# Patient Record
Sex: FEMALE | Race: WHITE
[De-identification: names, ages, dates, MRNs, and addresses within clinical notes are randomized per-mention and may not be internally consistent; named-entity substitution may affect disease eponyms.]

---

## 2020-07-06 LAB
ALBUMIN SERPL BCP-MCNC: 3.6 G/DL (ref 3.4–5)
ALBUMIN/GLOB SERPL: 1.1 {RATIO} (ref 1.1–1.5)
ALP SERPL-CCNC: 74 IU/L (ref 46–116)
ALT SERPL W P-5'-P-CCNC: 26 U/L (ref 30–65)
ANION GAP SERPL CALCULATED.3IONS-SCNC: 9 MMOL/L (ref 8–16)
AST SERPL W P-5'-P-CCNC: 17 U/L (ref 10–37)
BASOPHILS # BLD AUTO: 0 X10'3 (ref 0–0.2)
BASOPHILS NFR BLD AUTO: 0.9 % (ref 0–1)
BILIRUB SERPL-MCNC: 0.2 MG/DL (ref 0–1)
BUN SERPL-MCNC: 18 MG/DL (ref 7–18)
BUN/CREAT SERPL: 26.5 (ref 6.6–38)
CALCIUM SERPL-MCNC: 9.1 MG/DL (ref 8.5–10.1)
CHLORIDE SERPL-SCNC: 108 MMOL/L (ref 99–107)
CO2 SERPL-SCNC: 26.9 MMOL/L (ref 24–32)
CREAT SERPL-MCNC: 0.68 MG/DL (ref 0.4–0.9)
EOSINOPHIL # BLD AUTO: 0.1 X10'3 (ref 0–0.9)
EOSINOPHIL NFR BLD AUTO: 1.4 % (ref 0–6)
ERYTHROCYTE [DISTWIDTH] IN BLOOD BY AUTOMATED COUNT: 13.3 % (ref 11.5–14.5)
GFR SERPL CREATININE-BSD FRML MDRD: 88 ML/MIN
GLUCOSE SERPL-MCNC: 106 MG/DL (ref 70–104)
HCT VFR BLD AUTO: 38.3 % (ref 35–45)
HGB BLD-MCNC: 12.8 G/DL (ref 12–16)
LYMPHOCYTES # BLD AUTO: 1.9 X10'3 (ref 1.1–4.8)
LYMPHOCYTES NFR BLD AUTO: 34.7 % (ref 21–51)
MCH RBC QN AUTO: 30.6 PG (ref 27–31)
MCHC RBC AUTO-ENTMCNC: 33.4 G/DL (ref 33–36.5)
MCV RBC AUTO: 91.7 FL (ref 78–98)
MONOCYTES # BLD AUTO: 0.4 X10'3 (ref 0–0.9)
MONOCYTES NFR BLD AUTO: 7.1 % (ref 2–12)
NEUTROPHILS # BLD AUTO: 3 X10'3 (ref 1.8–7.7)
NEUTROPHILS NFR BLD AUTO: 55.9 % (ref 42–75)
PLATELET # BLD AUTO: 260 X10'3 (ref 140–440)
PMV BLD AUTO: 6.2 FL (ref 7.4–10.4)
POTASSIUM SERPL-SCNC: 3.9 MMOL/L (ref 3.4–5.1)
PROT SERPL-MCNC: 6.9 G/DL (ref 6.4–8.2)
RBC # BLD AUTO: 4.17 X10'6 (ref 4.2–5.6)
SODIUM SERPL-SCNC: 144 MMOL/L (ref 135–145)

## 2020-07-13 ENCOUNTER — HOSPITAL ENCOUNTER (OUTPATIENT)
Dept: HOSPITAL 94 - PAS | Age: 60
Discharge: HOME | End: 2020-07-13
Attending: ORTHOPAEDIC SURGERY
Payer: MEDICARE

## 2020-07-13 VITALS — DIASTOLIC BLOOD PRESSURE: 82 MMHG | SYSTOLIC BLOOD PRESSURE: 133 MMHG

## 2020-07-13 VITALS — SYSTOLIC BLOOD PRESSURE: 116 MMHG | DIASTOLIC BLOOD PRESSURE: 74 MMHG

## 2020-07-13 VITALS — DIASTOLIC BLOOD PRESSURE: 91 MMHG | SYSTOLIC BLOOD PRESSURE: 130 MMHG

## 2020-07-13 VITALS — SYSTOLIC BLOOD PRESSURE: 114 MMHG | DIASTOLIC BLOOD PRESSURE: 75 MMHG

## 2020-07-13 VITALS — HEIGHT: 64 IN | WEIGHT: 159 LBS | BODY MASS INDEX: 27.14 KG/M2

## 2020-07-13 VITALS — DIASTOLIC BLOOD PRESSURE: 81 MMHG | SYSTOLIC BLOOD PRESSURE: 138 MMHG

## 2020-07-13 VITALS — SYSTOLIC BLOOD PRESSURE: 129 MMHG | DIASTOLIC BLOOD PRESSURE: 80 MMHG

## 2020-07-13 DIAGNOSIS — Z11.59: ICD-10-CM

## 2020-07-13 DIAGNOSIS — M81.0: ICD-10-CM

## 2020-07-13 DIAGNOSIS — F41.9: ICD-10-CM

## 2020-07-13 DIAGNOSIS — M65.342: ICD-10-CM

## 2020-07-13 DIAGNOSIS — M65.331: ICD-10-CM

## 2020-07-13 DIAGNOSIS — F17.210: ICD-10-CM

## 2020-07-13 DIAGNOSIS — F31.9: ICD-10-CM

## 2020-07-13 DIAGNOSIS — M65.332: Primary | ICD-10-CM

## 2020-07-13 DIAGNOSIS — Z79.899: ICD-10-CM

## 2020-07-13 DIAGNOSIS — Z88.8: ICD-10-CM

## 2020-07-13 PROCEDURE — 80053 COMPREHEN METABOLIC PANEL: CPT

## 2020-07-13 PROCEDURE — 26055 INCISE FINGER TENDON SHEATH: CPT

## 2020-07-13 PROCEDURE — 20550 NJX 1 TENDON SHEATH/LIGAMENT: CPT

## 2020-07-13 PROCEDURE — 36415 COLL VENOUS BLD VENIPUNCTURE: CPT

## 2020-07-13 PROCEDURE — 85025 COMPLETE CBC W/AUTO DIFF WBC: CPT

## 2020-07-13 PROCEDURE — 82948 REAGENT STRIP/BLOOD GLUCOSE: CPT

## 2020-07-13 PROCEDURE — 93005 ELECTROCARDIOGRAM TRACING: CPT

## 2020-07-13 NOTE — NUR
Recommended to patient to go to GP and have her facial abscess seen and assessed. Pt states 
she will follow up.

## 2020-07-13 NOTE — NUR
PATIENT A&OX4, DENIES PAIN, V/S WNL, NEUROVASCULAR CHECKS INTACT, 20G PIV LUE D/C, SCD OFF, 
DRESSING TO RIGHT WRIST CDI ELEVATED WITH ICEBAG APPLIED. I HAVE REVIEWED D/C INSTRUCTIONS 
WITH PATIENT AND FAMILY AND THEY HAVE VERBALIZED UNDERSTANDING. PATIENT D/C HOME WITH ALL 
BELONGINGS AND FAMILY GAVE TRANSPORT HOME. PT states pain meds have called in to pharmacy 
from MD office.

## 2020-07-13 NOTE — NUR
Received from OR via [facility], accompanied by Anesthesiologist Elaine and report given by 
Anesthesiolgist. PATIENT A&OX4, DENIES PAIN, V/S WNL mask to 10L [id number], NEUROVASCULAR 
CHECKS INTACT, 20G PIV R hand, SCD ON, DRESSING TO RIGHT WRIST CDI ELEVATED WITH ICEBAG 
APPLIED.

## 2020-07-13 NOTE — NUR
PT TEARFUL, STATES "IT HURTS".  SHE FURTHER STATES SHE "WAS AFRAID TO SHOW US BECAUSE 
SURGERY MAY BE CANCELED". PT THEN PULLS DOWN HER MASK, THE LEFT UPPER LIP IS RED AND SWOLLEN 
AND THE LEFT CHEEK IS SWOLLEN. PT STATES HER LIP STARTED GETTING RED AND SWOLLEN YESTERDAY 
MORNING. DR CARBAJAL NOTIFIED BY PHONE AND DR GUARDADO AT THE BEDSIDE.  OKAY TO PROCEED 
WITH SURGERY AT THIS POINT.  PT INSTRUCTED TO F/U WITH PRIMARY MD OR CLINIC POST -OP.

## 2020-09-02 LAB
ALBUMIN SERPL BCP-MCNC: 3.8 G/DL (ref 3.4–5)
ALBUMIN/GLOB SERPL: 1.1 {RATIO} (ref 1.1–1.5)
ALP SERPL-CCNC: 92 IU/L (ref 46–116)
ALT SERPL W P-5'-P-CCNC: 23 U/L (ref 30–65)
ANION GAP SERPL CALCULATED.3IONS-SCNC: 7 MMOL/L (ref 8–16)
AST SERPL W P-5'-P-CCNC: 13 U/L (ref 10–37)
BASOPHILS # BLD AUTO: 0 X10'3 (ref 0–0.2)
BASOPHILS NFR BLD AUTO: 0.7 % (ref 0–1)
BILIRUB SERPL-MCNC: 0.3 MG/DL (ref 0–1)
BUN SERPL-MCNC: 15 MG/DL (ref 7–18)
BUN/CREAT SERPL: 19.5 (ref 6.6–38)
CALCIUM SERPL-MCNC: 8.9 MG/DL (ref 8.5–10.1)
CHLORIDE SERPL-SCNC: 105 MMOL/L (ref 99–107)
CO2 SERPL-SCNC: 27.2 MMOL/L (ref 24–32)
CREAT SERPL-MCNC: 0.77 MG/DL (ref 0.4–0.9)
EOSINOPHIL # BLD AUTO: 0.1 X10'3 (ref 0–0.9)
EOSINOPHIL NFR BLD AUTO: 1.3 % (ref 0–6)
ERYTHROCYTE [DISTWIDTH] IN BLOOD BY AUTOMATED COUNT: 13.9 % (ref 11.5–14.5)
GFR SERPL CREATININE-BSD FRML MDRD: 76 ML/MIN
GLUCOSE SERPL-MCNC: 98 MG/DL (ref 70–104)
HCT VFR BLD AUTO: 41.4 % (ref 35–45)
HGB BLD-MCNC: 13.7 G/DL (ref 12–16)
LYMPHOCYTES # BLD AUTO: 2.1 X10'3 (ref 1.1–4.8)
LYMPHOCYTES NFR BLD AUTO: 35.9 % (ref 21–51)
MCH RBC QN AUTO: 30.1 PG (ref 27–31)
MCHC RBC AUTO-ENTMCNC: 33.1 G/DL (ref 33–36.5)
MCV RBC AUTO: 91 FL (ref 78–98)
MONOCYTES # BLD AUTO: 0.4 X10'3 (ref 0–0.9)
MONOCYTES NFR BLD AUTO: 6.2 % (ref 2–12)
NEUTROPHILS # BLD AUTO: 3.3 X10'3 (ref 1.8–7.7)
NEUTROPHILS NFR BLD AUTO: 55.9 % (ref 42–75)
PLATELET # BLD AUTO: 299 X10'3 (ref 140–440)
PMV BLD AUTO: 6.6 FL (ref 7.4–10.4)
POTASSIUM SERPL-SCNC: 4 MMOL/L (ref 3.4–5.1)
PROT SERPL-MCNC: 7.2 G/DL (ref 6.4–8.2)
RBC # BLD AUTO: 4.55 X10'6 (ref 4.2–5.6)
SODIUM SERPL-SCNC: 139 MMOL/L (ref 135–145)

## 2020-09-11 ENCOUNTER — HOSPITAL ENCOUNTER (OUTPATIENT)
Dept: HOSPITAL 94 - PAS | Age: 60
Discharge: HOME | End: 2020-09-11
Attending: ORTHOPAEDIC SURGERY
Payer: MEDICARE

## 2020-09-11 VITALS — SYSTOLIC BLOOD PRESSURE: 100 MMHG | DIASTOLIC BLOOD PRESSURE: 51 MMHG

## 2020-09-11 VITALS — WEIGHT: 167.55 LBS | HEIGHT: 64 IN | BODY MASS INDEX: 28.6 KG/M2

## 2020-09-11 VITALS — DIASTOLIC BLOOD PRESSURE: 61 MMHG | SYSTOLIC BLOOD PRESSURE: 105 MMHG

## 2020-09-11 VITALS — DIASTOLIC BLOOD PRESSURE: 78 MMHG | SYSTOLIC BLOOD PRESSURE: 123 MMHG

## 2020-09-11 VITALS — DIASTOLIC BLOOD PRESSURE: 65 MMHG | SYSTOLIC BLOOD PRESSURE: 111 MMHG

## 2020-09-11 VITALS — DIASTOLIC BLOOD PRESSURE: 60 MMHG | SYSTOLIC BLOOD PRESSURE: 90 MMHG

## 2020-09-11 VITALS — SYSTOLIC BLOOD PRESSURE: 96 MMHG | DIASTOLIC BLOOD PRESSURE: 51 MMHG

## 2020-09-11 DIAGNOSIS — M65.331: Primary | ICD-10-CM

## 2020-09-11 DIAGNOSIS — B07.8: ICD-10-CM

## 2020-09-11 DIAGNOSIS — Z20.828: ICD-10-CM

## 2020-09-11 DIAGNOSIS — G89.4: ICD-10-CM

## 2020-09-11 DIAGNOSIS — I27.29: ICD-10-CM

## 2020-09-11 DIAGNOSIS — Z98.51: ICD-10-CM

## 2020-09-11 DIAGNOSIS — M19.90: ICD-10-CM

## 2020-09-11 DIAGNOSIS — E66.8: ICD-10-CM

## 2020-09-11 DIAGNOSIS — K21.9: ICD-10-CM

## 2020-09-11 DIAGNOSIS — M19.011: ICD-10-CM

## 2020-09-11 DIAGNOSIS — M41.9: ICD-10-CM

## 2020-09-11 DIAGNOSIS — F17.210: ICD-10-CM

## 2020-09-11 DIAGNOSIS — Z88.8: ICD-10-CM

## 2020-09-11 DIAGNOSIS — M81.0: ICD-10-CM

## 2020-09-11 DIAGNOSIS — G25.81: ICD-10-CM

## 2020-09-11 DIAGNOSIS — M85.80: ICD-10-CM

## 2020-09-11 DIAGNOSIS — Z98.890: ICD-10-CM

## 2020-09-11 DIAGNOSIS — Z90.710: ICD-10-CM

## 2020-09-11 DIAGNOSIS — F41.9: ICD-10-CM

## 2020-09-11 DIAGNOSIS — Z79.899: ICD-10-CM

## 2020-09-11 DIAGNOSIS — F31.9: ICD-10-CM

## 2020-09-11 PROCEDURE — 11420 EXC H-F-NK-SP B9+MARG 0.5/<: CPT

## 2020-09-11 PROCEDURE — 36415 COLL VENOUS BLD VENIPUNCTURE: CPT

## 2020-09-11 PROCEDURE — 80053 COMPREHEN METABOLIC PANEL: CPT

## 2020-09-11 PROCEDURE — 26055 INCISE FINGER TENDON SHEATH: CPT

## 2020-09-11 PROCEDURE — 85025 COMPLETE CBC W/AUTO DIFF WBC: CPT

## 2020-09-11 PROCEDURE — 87635 SARS-COV-2 COVID-19 AMP PRB: CPT

## 2020-09-11 PROCEDURE — 82948 REAGENT STRIP/BLOOD GLUCOSE: CPT

## 2020-09-11 NOTE — NUR
PATIENT HAS MET ALL DC CRITERIA FOR HOME. VSS. PAIN AT A TOLERABLE LEVEL. PATIENT TAKEN OUT 
VIA WHEELCHAIR TO PERSONAL VEHICLE WHERE PATIENT WAS TAKEN HOME. DRESSING CDI. ALL DC 
INSTRUCTIONS DISCUSSED WITH PATIENT. ALL QUESTIONS ANSWERED. 




-------------------------------------------------------------------------------

Addendum: 09/11/20 at 1418 by Migel Neumann RN RN

-------------------------------------------------------------------------------

Amended: Links added.

## 2020-09-11 NOTE — NUR
Received from OR via [facility] , accompanied by Anesthesiologist DEBBY and report given 
by Anesthesiolgist. PATIENT WITH 20G PIV IN LEFT UE RUNNING LR ..DENIES PAIN TO RIGHT 
HAND. 

RIGHT MIDDLE FINGER DRESSING IS SPOTTED WITH BLOOD BUT CONTAINED WITHIN DRESSING. 


-------------------------------------------------------------------------------

Addendum: 09/11/20 at 1326 by Migel Neumann RN, RN

-------------------------------------------------------------------------------

Amended: Links added.

## 2022-04-22 LAB
ALBUMIN SERPL BCP-MCNC: 3.6 G/DL (ref 3.4–5)
ALBUMIN/GLOB SERPL: 1.2 {RATIO} (ref 1.1–1.5)
ALP SERPL-CCNC: 89 IU/L (ref 46–116)
ALT SERPL W P-5'-P-CCNC: 25 U/L (ref 30–65)
ANION GAP SERPL CALCULATED.3IONS-SCNC: 9 MMOL/L (ref 8–16)
AST SERPL W P-5'-P-CCNC: 18 U/L (ref 10–37)
BACTERIA URNS QL MICRO: (no result) /HPF
BASOPHILS # BLD AUTO: 0 X10'3 (ref 0–0.2)
BASOPHILS NFR BLD AUTO: 0.7 % (ref 0–1)
BILIRUB SERPL-MCNC: 0.2 MG/DL (ref 0–1)
BUN SERPL-MCNC: 16 MG/DL (ref 7–18)
BUN/CREAT SERPL: 21.9 (ref 6.6–38)
CALCIUM SERPL-MCNC: 8.9 MG/DL (ref 8.5–10.1)
CHLORIDE SERPL-SCNC: 103 MMOL/L (ref 99–107)
CLARITY UR: CLEAR
CO2 SERPL-SCNC: 27.5 MMOL/L (ref 24–32)
COLOR UR: YELLOW
CREAT SERPL-MCNC: 0.73 MG/DL (ref 0.4–0.9)
DEPRECATED SQUAMOUS URNS QL MICRO: (no result) /LPF
EOSINOPHIL # BLD AUTO: 0.1 X10'3 (ref 0–0.9)
EOSINOPHIL NFR BLD AUTO: 2.2 % (ref 0–6)
ERYTHROCYTE [DISTWIDTH] IN BLOOD BY AUTOMATED COUNT: 13.1 % (ref 11.5–14.5)
GFR SERPL CREATININE-BSD FRML MDRD: 81 ML/MIN
GLUCOSE SERPL-MCNC: 114 MG/DL (ref 70–104)
GLUCOSE UR STRIP-MCNC: NEGATIVE MG/DL
HCT VFR BLD AUTO: 37.4 % (ref 35–45)
HGB BLD-MCNC: 12.9 G/DL (ref 12–16)
HGB UR QL STRIP: (no result)
KETONES UR STRIP-MCNC: NEGATIVE MG/DL
LEUKOCYTE ESTERASE UR QL STRIP: NEGATIVE
LYMPHOCYTES # BLD AUTO: 2.5 X10'3 (ref 1.1–4.8)
LYMPHOCYTES NFR BLD AUTO: 39 % (ref 21–51)
MCH RBC QN AUTO: 30.2 PG (ref 27–31)
MCHC RBC AUTO-ENTMCNC: 34.6 G/DL (ref 33–36.5)
MCV RBC AUTO: 87.1 FL (ref 78–98)
MONOCYTES # BLD AUTO: 0.4 X10'3 (ref 0–0.9)
MONOCYTES NFR BLD AUTO: 6.2 % (ref 2–12)
MUCOUS THREADS URNS QL MICRO: (no result) /LPF
NEUTROPHILS # BLD AUTO: 3.3 X10'3 (ref 1.8–7.7)
NEUTROPHILS NFR BLD AUTO: 51.9 % (ref 42–75)
NITRITE UR QL STRIP: NEGATIVE
PH UR STRIP: 6 [PH] (ref 4.8–8)
PLATELET # BLD AUTO: 272 X10'3 (ref 140–440)
PMV BLD AUTO: 6.4 FL (ref 7.4–10.4)
POTASSIUM SERPL-SCNC: 3.7 MMOL/L (ref 3.4–5.1)
PROT SERPL-MCNC: 6.7 G/DL (ref 6.4–8.2)
PROT UR QL STRIP: NEGATIVE MG/DL
RBC # BLD AUTO: 4.29 X10'6 (ref 4.2–5.6)
RBC #/AREA URNS HPF: (no result) /HPF (ref 0–2)
SODIUM SERPL-SCNC: 139 MMOL/L (ref 135–145)
SP GR UR STRIP: >=1.03 (ref 1–1.03)
URN COLLECT METHOD CLASS: (no result)
UROBILINOGEN UR STRIP-MCNC: 0.2 E.U/DL (ref 0.2–1)
WBC #/AREA URNS HPF: (no result) /HPF (ref 0–4)

## 2022-04-29 ENCOUNTER — HOSPITAL ENCOUNTER (OUTPATIENT)
Dept: HOSPITAL 94 - PAS | Age: 62
Discharge: HOME | End: 2022-04-29
Attending: PODIATRIST
Payer: MEDICARE

## 2022-04-29 VITALS — SYSTOLIC BLOOD PRESSURE: 106 MMHG | DIASTOLIC BLOOD PRESSURE: 69 MMHG

## 2022-04-29 VITALS — DIASTOLIC BLOOD PRESSURE: 80 MMHG | SYSTOLIC BLOOD PRESSURE: 138 MMHG

## 2022-04-29 VITALS — SYSTOLIC BLOOD PRESSURE: 114 MMHG | DIASTOLIC BLOOD PRESSURE: 63 MMHG

## 2022-04-29 VITALS — SYSTOLIC BLOOD PRESSURE: 106 MMHG | DIASTOLIC BLOOD PRESSURE: 65 MMHG

## 2022-04-29 VITALS — SYSTOLIC BLOOD PRESSURE: 103 MMHG | DIASTOLIC BLOOD PRESSURE: 82 MMHG

## 2022-04-29 VITALS — DIASTOLIC BLOOD PRESSURE: 70 MMHG | SYSTOLIC BLOOD PRESSURE: 111 MMHG

## 2022-04-29 VITALS — DIASTOLIC BLOOD PRESSURE: 70 MMHG | SYSTOLIC BLOOD PRESSURE: 93 MMHG

## 2022-04-29 VITALS — DIASTOLIC BLOOD PRESSURE: 57 MMHG | SYSTOLIC BLOOD PRESSURE: 105 MMHG

## 2022-04-29 VITALS — HEIGHT: 64 IN | BODY MASS INDEX: 29.77 KG/M2 | WEIGHT: 174.39 LBS

## 2022-04-29 VITALS — DIASTOLIC BLOOD PRESSURE: 57 MMHG | SYSTOLIC BLOOD PRESSURE: 107 MMHG

## 2022-04-29 VITALS — SYSTOLIC BLOOD PRESSURE: 101 MMHG | DIASTOLIC BLOOD PRESSURE: 63 MMHG

## 2022-04-29 VITALS — SYSTOLIC BLOOD PRESSURE: 115 MMHG | DIASTOLIC BLOOD PRESSURE: 62 MMHG

## 2022-04-29 DIAGNOSIS — M96.0: Primary | ICD-10-CM

## 2022-04-29 DIAGNOSIS — F17.210: ICD-10-CM

## 2022-04-29 DIAGNOSIS — M41.9: ICD-10-CM

## 2022-04-29 DIAGNOSIS — Z98.51: ICD-10-CM

## 2022-04-29 DIAGNOSIS — Z98.890: ICD-10-CM

## 2022-04-29 DIAGNOSIS — Z90.710: ICD-10-CM

## 2022-04-29 DIAGNOSIS — Z79.899: ICD-10-CM

## 2022-04-29 DIAGNOSIS — G89.4: ICD-10-CM

## 2022-04-29 DIAGNOSIS — Z20.822: ICD-10-CM

## 2022-04-29 DIAGNOSIS — F31.9: ICD-10-CM

## 2022-04-29 DIAGNOSIS — K21.9: ICD-10-CM

## 2022-04-29 DIAGNOSIS — F41.9: ICD-10-CM

## 2022-04-29 DIAGNOSIS — G25.81: ICD-10-CM

## 2022-04-29 DIAGNOSIS — E66.8: ICD-10-CM

## 2022-04-29 DIAGNOSIS — G20: ICD-10-CM

## 2022-04-29 DIAGNOSIS — M85.80: ICD-10-CM

## 2022-04-29 DIAGNOSIS — I27.29: ICD-10-CM

## 2022-04-29 DIAGNOSIS — Z88.8: ICD-10-CM

## 2022-04-29 DIAGNOSIS — M19.011: ICD-10-CM

## 2022-04-29 DIAGNOSIS — M81.0: ICD-10-CM

## 2022-04-29 DIAGNOSIS — G89.18: ICD-10-CM

## 2022-04-29 DIAGNOSIS — Z79.82: ICD-10-CM

## 2022-04-29 PROCEDURE — 76000 FLUOROSCOPY <1 HR PHYS/QHP: CPT

## 2022-04-29 PROCEDURE — 36415 COLL VENOUS BLD VENIPUNCTURE: CPT

## 2022-04-29 PROCEDURE — 20900 REMOVAL OF BONE FOR GRAFT: CPT

## 2022-04-29 PROCEDURE — 93005 ELECTROCARDIOGRAM TRACING: CPT

## 2022-04-29 PROCEDURE — 64447 NJX AA&/STRD FEMORAL NRV IMG: CPT

## 2022-04-29 PROCEDURE — 64450 NJX AA&/STRD OTHER PN/BRANCH: CPT

## 2022-04-29 PROCEDURE — 81001 URINALYSIS AUTO W/SCOPE: CPT

## 2022-04-29 PROCEDURE — 82948 REAGENT STRIP/BLOOD GLUCOSE: CPT

## 2022-04-29 PROCEDURE — 20680 REMOVAL OF IMPLANT DEEP: CPT

## 2022-04-29 PROCEDURE — 80053 COMPREHEN METABOLIC PANEL: CPT

## 2022-04-29 PROCEDURE — 73620 X-RAY EXAM OF FOOT: CPT

## 2022-04-29 PROCEDURE — 28750 FUSION OF BIG TOE JOINT: CPT

## 2022-04-29 PROCEDURE — 85025 COMPLETE CBC W/AUTO DIFF WBC: CPT

## 2022-04-29 NOTE — NUR
Received from OR [facility] , accompanied by Anesthesiologist  DR ZHOU and report 
given by Anesthesiolgist. PT PRESENTS WITH PIV 20G RIGHT WRIST, DRESSING ON LEFT FOOT, BLOOD 
TINGED ON HEEL, VSS.

-------------------------------------------------------------------------------

Addendum: 04/29/22 at 1135 by Eugenia Neumann RN, RN

-------------------------------------------------------------------------------

Amended: Links added.

## 2022-04-29 NOTE — NUR
PT MEETS ALL DC CRITERIA.  IV DC'D WITH CANULA INTACT, LEFT FOOT/HEEL DRESSING CDI, NO 
DRAINAGE NOTED AT THIS TIME.  PT GIVEN BOOT FOR PARTIAL WEIGHT BEARING ALONG WITH CRUTCHES.  
DC INSTRUCTIONS REVIEWED WITH PT, PT VERBALIZED UNDERSTANDING WITH NO FURTHER QUESTIONS AT 
THIS TIME.  PT REPORTS SHE PICKED UP RX FROM DR GREENE.  PT WHEELED OUT OF HOSPITAL TO 
PRIVATE VEHICLE WHERE  WAS WAITING.  PT ADVISED TO ICE AND ELEVATE QAND FOLLOWUP WITH 
DR GREENE IN 1-2 WEEKS.

-------------------------------------------------------------------------------

Addendum: 04/29/22 at 1424 by Eugenia Neumann RN, RN

-------------------------------------------------------------------------------

Amended: Links added.

## 2022-04-29 NOTE — NUR
PT SITTING UP IN BED EATING ICE CHIPS, JELLO AND 4 PACKAGES OF EN CRACKERS.  PT ASKING 
TO GO HOME AT THIS TIME.  

-------------------------------------------------------------------------------

Addendum: 04/29/22 at 1218 by Eugenia Neumann RN, RN

-------------------------------------------------------------------------------

Amended: Links added.

## 2023-07-03 NOTE — NUR
PT'S DRESSING RE-WRAPPED WITH 4X4, GAUZE BANDAGE AND ELASTIC BANDAGE.

-------------------------------------------------------------------------------

Addendum: 04/29/22 at 1135 by Eugenia Neumann RN, RN

-------------------------------------------------------------------------------

Amended: Links added. Xolair Counseling:  Patient informed of potential adverse effects including but not limited to fever, muscle aches, rash and allergic reactions.  The patient verbalized understanding of the proper use and possible adverse effects of Xolair.  All of the patient's questions and concerns were addressed.

## 2025-03-17 ENCOUNTER — HOSPITAL ENCOUNTER (EMERGENCY)
Dept: HOSPITAL 94 - ER | Age: 65
Discharge: HOME | End: 2025-03-17
Payer: MEDICARE

## 2025-03-17 VITALS
DIASTOLIC BLOOD PRESSURE: 76 MMHG | HEART RATE: 89 BPM | TEMPERATURE: 98.1 F | OXYGEN SATURATION: 97 % | RESPIRATION RATE: 16 BRPM | SYSTOLIC BLOOD PRESSURE: 152 MMHG

## 2025-03-17 VITALS — HEIGHT: 64 IN | WEIGHT: 150.31 LBS | BODY MASS INDEX: 25.66 KG/M2

## 2025-03-17 DIAGNOSIS — M79.671: Primary | ICD-10-CM

## 2025-03-17 PROCEDURE — 99283 EMERGENCY DEPT VISIT LOW MDM: CPT

## 2025-03-17 PROCEDURE — 73630 X-RAY EXAM OF FOOT: CPT

## 2025-06-05 ENCOUNTER — HOSPITAL ENCOUNTER (EMERGENCY)
Dept: HOSPITAL 94 - ER | Age: 65
Discharge: HOME | End: 2025-06-05
Payer: MEDICARE

## 2025-06-05 VITALS — HEIGHT: 64 IN | WEIGHT: 150.31 LBS | BODY MASS INDEX: 25.66 KG/M2

## 2025-06-05 VITALS — TEMPERATURE: 98.7 F

## 2025-06-05 VITALS
RESPIRATION RATE: 18 BRPM | DIASTOLIC BLOOD PRESSURE: 82 MMHG | OXYGEN SATURATION: 99 % | HEART RATE: 98 BPM | SYSTOLIC BLOOD PRESSURE: 145 MMHG
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
## 2025-06-05 DIAGNOSIS — S60.352A: Primary | ICD-10-CM
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
## 2025-06-05 PROCEDURE — 90471 IMMUNIZATION ADMIN: CPT

## 2025-06-05 PROCEDURE — 99284 EMERGENCY DEPT VISIT MOD MDM: CPT

## 2025-06-05 PROCEDURE — 90715 TDAP VACCINE 7 YRS/> IM: CPT

## 2025-06-05 RX ADMIN — TETANUS TOXOID, REDUCED DIPHTHERIA TOXOID AND ACELLULAR PERTUSSIS VACCINE, ADSORBED ONE ML: 5; 2.5; 8; 8; 2.5 SUSPENSION INTRAMUSCULAR at 22:33

## 2025-06-05 RX ADMIN — BACITRACIN ONE APPLIC: 500 OINTMENT TOPICAL at 22:32
